# Patient Record
Sex: MALE | Race: WHITE | NOT HISPANIC OR LATINO
[De-identification: names, ages, dates, MRNs, and addresses within clinical notes are randomized per-mention and may not be internally consistent; named-entity substitution may affect disease eponyms.]

---

## 2018-11-06 PROBLEM — Z00.00 ENCOUNTER FOR PREVENTIVE HEALTH EXAMINATION: Status: ACTIVE | Noted: 2018-11-06

## 2018-12-06 ENCOUNTER — APPOINTMENT (OUTPATIENT)
Dept: ENDOCRINOLOGY | Facility: CLINIC | Age: 73
End: 2018-12-06
Payer: MEDICARE

## 2018-12-06 VITALS
OXYGEN SATURATION: 99 % | HEIGHT: 71 IN | WEIGHT: 223 LBS | BODY MASS INDEX: 31.22 KG/M2 | DIASTOLIC BLOOD PRESSURE: 80 MMHG | SYSTOLIC BLOOD PRESSURE: 143 MMHG | HEART RATE: 72 BPM

## 2018-12-06 PROCEDURE — 99204 OFFICE O/P NEW MOD 45 MIN: CPT

## 2019-07-03 ENCOUNTER — RX RENEWAL (OUTPATIENT)
Age: 74
End: 2019-07-03

## 2019-10-16 ENCOUNTER — OTHER (OUTPATIENT)
Age: 74
End: 2019-10-16

## 2019-10-16 ENCOUNTER — APPOINTMENT (OUTPATIENT)
Dept: ENDOCRINOLOGY | Facility: CLINIC | Age: 74
End: 2019-10-16
Payer: MEDICARE

## 2019-10-16 VITALS
DIASTOLIC BLOOD PRESSURE: 70 MMHG | WEIGHT: 222 LBS | SYSTOLIC BLOOD PRESSURE: 122 MMHG | HEART RATE: 68 BPM | HEIGHT: 71 IN | BODY MASS INDEX: 31.08 KG/M2

## 2019-10-16 PROCEDURE — 99214 OFFICE O/P EST MOD 30 MIN: CPT

## 2019-10-16 NOTE — PHYSICAL EXAM
[No Neck Mass] : no neck mass was observed [Clear to Auscultation] : lungs were clear to auscultation bilaterally [Regular Rhythm] : with a regular rhythm

## 2019-10-16 NOTE — ASSESSMENT
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 Repeat calcium level ordered.

## 2019-10-16 NOTE — HISTORY OF PRESENT ILLNESS
[FreeTextEntry1] : h/o post-ablative hypothyroidism; likely treated by me more than 20 years ago. On varying T4 doses, most recently .175; last dose change 6 months ago. Pt. wanted endocrine evaluation so returned here.\par Some fatigue, otherwise without complaints.\par \par concern raised by recent labs revealing a calcium of 10.8 with a slight elevation of PTH; however a simultaneous calcium level was 9.9. No prior levels for comparison available to me.\par \par PMH:\par atrial fib\par HTN\par lipids\par polycystic kidney disease

## 2019-10-17 ENCOUNTER — OTHER (OUTPATIENT)
Age: 74
End: 2019-10-17

## 2019-10-17 LAB
ALBUMIN SERPL ELPH-MCNC: 4.6 G/DL
ALP BLD-CCNC: 53 U/L
ALT SERPL-CCNC: 12 U/L
ANION GAP SERPL CALC-SCNC: 14 MMOL/L
AST SERPL-CCNC: 16 U/L
BILIRUB SERPL-MCNC: 0.7 MG/DL
BUN SERPL-MCNC: 32 MG/DL
CALCIUM SERPL-MCNC: 11.1 MG/DL
CHLORIDE SERPL-SCNC: 108 MMOL/L
CO2 SERPL-SCNC: 21 MMOL/L
CREAT SERPL-MCNC: 2.38 MG/DL
GLUCOSE SERPL-MCNC: 85 MG/DL
PHOSPHATE SERPL-MCNC: 2.6 MG/DL
POTASSIUM SERPL-SCNC: 5.2 MMOL/L
PROT SERPL-MCNC: 7.6 G/DL
SODIUM SERPL-SCNC: 143 MMOL/L

## 2019-12-23 ENCOUNTER — MOBILE ON CALL (OUTPATIENT)
Age: 74
End: 2019-12-23

## 2019-12-23 ENCOUNTER — RX RENEWAL (OUTPATIENT)
Age: 74
End: 2019-12-23

## 2020-03-06 ENCOUNTER — APPOINTMENT (OUTPATIENT)
Dept: ENDOCRINOLOGY | Facility: CLINIC | Age: 75
End: 2020-03-06
Payer: MEDICARE

## 2020-03-06 VITALS
SYSTOLIC BLOOD PRESSURE: 128 MMHG | HEIGHT: 71 IN | HEART RATE: 70 BPM | BODY MASS INDEX: 31.08 KG/M2 | DIASTOLIC BLOOD PRESSURE: 80 MMHG | WEIGHT: 222 LBS

## 2020-03-06 PROCEDURE — 99214 OFFICE O/P EST MOD 30 MIN: CPT

## 2020-03-06 RX ORDER — METOPROLOL SUCCINATE 25 MG/1
25 TABLET, EXTENDED RELEASE ORAL
Refills: 0 | Status: ACTIVE | COMMUNITY

## 2020-03-06 NOTE — ASSESSMENT
[FreeTextEntry1] : primary hyperparathyroidism, mild and asymptomatic. Underlying renal dysfunction due to polycystic kidney disease; doubt risk of further decline due to hyperparathyroidism, but will seek input from pt's nephrologist. 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

## 2020-03-06 NOTE — HISTORY OF PRESENT ILLNESS
[FreeTextEntry1] : h/o post-ablative hypothyroidism; likely treated by me more than 20 years ago. On varying T4 doses, most recently .175; last dose change 6 months ago. Pt. wanted endocrine evaluation so returned here.\par Some fatigue, otherwise without complaints.\par \par concern raised by recent labs revealing a calcium of 10.8 with a slight elevation of PTH\par Current calcium level 10.5 with an elevated ionized calcium and a PTH of 79.\par \par PMH:\par atrial fib\par HTN\par lipids\par polycystic kidney disease

## 2020-03-06 NOTE — REVIEW OF SYSTEMS
[Shortness Of Breath] : shortness of breath [Chest Pain] : no chest pain [FreeTextEntry6] : chronic with exertion

## 2020-06-18 ENCOUNTER — RX RENEWAL (OUTPATIENT)
Age: 75
End: 2020-06-18

## 2020-09-18 ENCOUNTER — APPOINTMENT (OUTPATIENT)
Dept: ENDOCRINOLOGY | Facility: CLINIC | Age: 75
End: 2020-09-18
Payer: MEDICARE

## 2020-09-18 VITALS
HEIGHT: 71 IN | HEART RATE: 72 BPM | WEIGHT: 218 LBS | BODY MASS INDEX: 30.52 KG/M2 | DIASTOLIC BLOOD PRESSURE: 66 MMHG | SYSTOLIC BLOOD PRESSURE: 122 MMHG | OXYGEN SATURATION: 96 %

## 2020-09-18 PROCEDURE — 99214 OFFICE O/P EST MOD 30 MIN: CPT

## 2020-09-18 NOTE — PHYSICAL EXAM
[Thyroid Not Enlarged] : the thyroid was not enlarged [Clear to Auscultation] : lungs were clear to auscultation bilaterally [Normal Rate] : heart rate was normal

## 2020-09-18 NOTE — HISTORY OF PRESENT ILLNESS
[FreeTextEntry1] : h/o post-ablative hypothyroidism;  treated by me in 1994. On varying T4 doses, most recently .175; last dose change 6 months ago. Pt. wanted endocrine evaluation so returned here.\par Some fatigue, otherwise without complaints.\par \par concern raised by recent labs revealing a calcium of 10.8 with a slight elevation of PTH\par Repeat calcium level 10.5 with an elevated ionized calcium and a PTH of 79.\par \par PMH:\par atrial fib\par HTN\par lipids\par polycystic kidney disease

## 2020-09-18 NOTE — ASSESSMENT
[FreeTextEntry1] : primary hyperparathyroidism, mild and asymptomatic. Underlying renal dysfunction due to polycystic kidney disease; doubt risk of further decline due to hyperparathyroidism, but will seek input from pt's nephrologist. My inclination is to observe without resorting to surgery.\par post-ablative hypothyroidism, over-replaced; decrease T4 to .15\par

## 2021-03-25 ENCOUNTER — APPOINTMENT (OUTPATIENT)
Dept: ENDOCRINOLOGY | Facility: CLINIC | Age: 76
End: 2021-03-25
Payer: MEDICARE

## 2021-03-25 VITALS
SYSTOLIC BLOOD PRESSURE: 125 MMHG | DIASTOLIC BLOOD PRESSURE: 80 MMHG | HEIGHT: 71 IN | HEART RATE: 70 BPM | BODY MASS INDEX: 26.88 KG/M2 | OXYGEN SATURATION: 99 % | WEIGHT: 192 LBS

## 2021-03-25 PROCEDURE — 99214 OFFICE O/P EST MOD 30 MIN: CPT

## 2021-03-25 NOTE — ASSESSMENT
[FreeTextEntry1] : primary hyperparathyroidism, mild and asymptomatic. Underlying renal dysfunction due to polycystic kidney disease; doubt risk of further decline due to hyperparathyroidism, but will seek input from pt's nephrologist. My inclination is to observe without resorting to surgery.\par post-ablative hypothyroidism, well replaced on T4 .15\par

## 2021-03-25 NOTE — HISTORY OF PRESENT ILLNESS
[FreeTextEntry1] : h/o post-ablative hypothyroidism;  treated by me in 1994. On varying T4 doses, most recently .15; Pt. wanted endocrine evaluation so returned here.\par Some fatigue, otherwise without complaints.\par \par concern raised by recent labs revealing a calcium of 10.8 with a slight elevation of PTH\par Repeat calcium level 10.5 with an elevated ionized calcium and a PTH of 79.\par \par PMH:\par atrial fib\par HTN\par lipids\par polycystic kidney disease

## 2021-08-28 ENCOUNTER — RX RENEWAL (OUTPATIENT)
Age: 76
End: 2021-08-28

## 2021-09-14 ENCOUNTER — APPOINTMENT (OUTPATIENT)
Dept: ENDOCRINOLOGY | Facility: CLINIC | Age: 76
End: 2021-09-14
Payer: MEDICARE

## 2021-09-14 VITALS
OXYGEN SATURATION: 99 % | DIASTOLIC BLOOD PRESSURE: 64 MMHG | HEIGHT: 70 IN | SYSTOLIC BLOOD PRESSURE: 122 MMHG | BODY MASS INDEX: 26.63 KG/M2 | WEIGHT: 186 LBS | HEART RATE: 52 BPM

## 2021-09-14 PROCEDURE — 99214 OFFICE O/P EST MOD 30 MIN: CPT

## 2021-09-14 NOTE — ASSESSMENT
[FreeTextEntry1] : primary hyperparathyroidism, mild and asymptomatic. Underlying renal dysfunction due to polycystic kidney disease; doubt risk of further decline due to hyperparathyroidism. My inclination is to observe without resorting to surgery.\par post-ablative hypothyroidism, well replaced on T4 .15\par

## 2022-03-15 ENCOUNTER — APPOINTMENT (OUTPATIENT)
Dept: ENDOCRINOLOGY | Facility: CLINIC | Age: 77
End: 2022-03-15
Payer: MEDICARE

## 2022-03-15 VITALS
WEIGHT: 189 LBS | DIASTOLIC BLOOD PRESSURE: 66 MMHG | HEIGHT: 70 IN | SYSTOLIC BLOOD PRESSURE: 118 MMHG | BODY MASS INDEX: 27.06 KG/M2

## 2022-03-15 PROCEDURE — 99214 OFFICE O/P EST MOD 30 MIN: CPT

## 2022-03-15 RX ORDER — ICOSAPENT ETHYL 1000 MG/1
1 CAPSULE ORAL
Refills: 0 | Status: ACTIVE | COMMUNITY

## 2022-03-15 RX ORDER — WARFARIN 5 MG/1
5 TABLET ORAL
Qty: 144 | Refills: 1 | Status: DISCONTINUED | COMMUNITY
End: 2022-03-15

## 2022-03-15 NOTE — ASSESSMENT
[FreeTextEntry1] : primary hyperparathyroidism, worsening, but not symptomatic. Will start sensipar, as pt. is not an ideal candidate for surgery.\par post-ablative hypothyroidism, well replaced on T4 .15\par

## 2022-07-15 ENCOUNTER — APPOINTMENT (OUTPATIENT)
Dept: ENDOCRINOLOGY | Facility: CLINIC | Age: 77
End: 2022-07-15

## 2022-07-15 VITALS
SYSTOLIC BLOOD PRESSURE: 124 MMHG | HEIGHT: 70 IN | OXYGEN SATURATION: 98 % | HEART RATE: 82 BPM | BODY MASS INDEX: 26.34 KG/M2 | DIASTOLIC BLOOD PRESSURE: 70 MMHG | WEIGHT: 184 LBS

## 2022-07-15 PROCEDURE — 99214 OFFICE O/P EST MOD 30 MIN: CPT

## 2022-07-15 NOTE — HISTORY OF PRESENT ILLNESS
[FreeTextEntry1] : h/o post-ablative hypothyroidism;  treated by me in 1994. On varying T4 doses, most recently .15; Pt. wanted endocrine evaluation so returned here.\par Some fatigue, otherwise without complaints.\par \par concern raised by recent labs revealing a calcium of 10.8 with a slight elevation of PTH\par Repeat calcium level 10.5 with an elevated ionized calcium and a PTH of 79. Started on sensipar with improvement\par \par PMH:\par atrial fib\par HTN\par lipids\par polycystic kidney disease

## 2022-07-15 NOTE — ASSESSMENT
[FreeTextEntry1] : primary hyperparathyroidism, controlled on sensipar\par post-ablative hypothyroidism, well replaced on T4 .15\par

## 2022-10-31 ENCOUNTER — RX RENEWAL (OUTPATIENT)
Age: 77
End: 2022-10-31

## 2022-12-13 ENCOUNTER — RX RENEWAL (OUTPATIENT)
Age: 77
End: 2022-12-13

## 2023-01-09 LAB — TSH SERPL-ACNC: 9.27

## 2023-01-10 ENCOUNTER — APPOINTMENT (OUTPATIENT)
Dept: ENDOCRINOLOGY | Facility: CLINIC | Age: 78
End: 2023-01-10
Payer: MEDICARE

## 2023-01-10 VITALS — OXYGEN SATURATION: 98 % | DIASTOLIC BLOOD PRESSURE: 88 MMHG | SYSTOLIC BLOOD PRESSURE: 134 MMHG | HEART RATE: 49 BPM

## 2023-01-10 VITALS — BODY MASS INDEX: 29.06 KG/M2 | HEIGHT: 70 IN | WEIGHT: 203 LBS

## 2023-01-10 PROCEDURE — 99214 OFFICE O/P EST MOD 30 MIN: CPT

## 2023-01-10 NOTE — HISTORY OF PRESENT ILLNESS
[FreeTextEntry1] : h/o post-ablative hypothyroidism;  treated by me in 1994. On varying T4 doses, most recently .175; Pt. wanted endocrine evaluation so returned here.\par Some fatigue, otherwise without complaints.\par \par concern raised by recent labs revealing a calcium of 10.8 with a slight elevation of PTH\par Repeat calcium level 10.5 with an elevated ionized calcium and a PTH of 79. Started on sensipar with improvement\par \par PMH:\par atrial fib\par HTN\par lipids\par polycystic kidney disease

## 2023-01-10 NOTE — ASSESSMENT
[FreeTextEntry1] : primary hyperparathyroidism, controlled on sensipar\par post-ablative hypothyroidism, with increasing need for T4. Advised to take synthroid separately and repeat labs in one month; if no improvement will increase to .2 mg daily\par

## 2023-04-28 ENCOUNTER — RX RENEWAL (OUTPATIENT)
Age: 78
End: 2023-04-28

## 2023-06-20 ENCOUNTER — APPOINTMENT (OUTPATIENT)
Dept: ENDOCRINOLOGY | Facility: CLINIC | Age: 78
End: 2023-06-20

## 2023-08-03 LAB — TSH SERPL-ACNC: 1.08

## 2023-08-04 ENCOUNTER — APPOINTMENT (OUTPATIENT)
Dept: ENDOCRINOLOGY | Facility: CLINIC | Age: 78
End: 2023-08-04
Payer: MEDICARE

## 2023-08-04 VITALS
SYSTOLIC BLOOD PRESSURE: 118 MMHG | BODY MASS INDEX: 28.63 KG/M2 | WEIGHT: 200 LBS | HEIGHT: 70 IN | DIASTOLIC BLOOD PRESSURE: 78 MMHG | HEART RATE: 85 BPM | OXYGEN SATURATION: 99 %

## 2023-08-04 PROCEDURE — 99214 OFFICE O/P EST MOD 30 MIN: CPT

## 2023-08-04 NOTE — HISTORY OF PRESENT ILLNESS
[FreeTextEntry1] : h/o post-ablative hypothyroidism;  treated by me in 1994. On varying T4 doses, most recently .175; Pt. wanted endocrine evaluation so returned here. Some fatigue, otherwise without complaints.  concern raised by recent labs revealing a calcium of 10.8 with a slight elevation of PTH Repeat calcium level 10.5 with an elevated ionized calcium and a PTH of 79. Started on sensipar with improvement  PMH: atrial fib HTN lipids polycystic kidney disease

## 2023-08-04 NOTE — ASSESSMENT
[FreeTextEntry1] : primary hyperparathyroidism, apparently controlled on sensipar post-ablative hypothyroidism, with increasing need for T4. Appears euthyroid.  Update labs

## 2024-01-01 ENCOUNTER — RX RENEWAL (OUTPATIENT)
Age: 79
End: 2024-01-01

## 2024-01-01 RX ORDER — CINACALCET 60 MG/1
60 TABLET ORAL
Qty: 90 | Refills: 3 | Status: ACTIVE | COMMUNITY
Start: 2022-03-15 | End: 1900-01-01

## 2024-02-13 ENCOUNTER — APPOINTMENT (OUTPATIENT)
Dept: ENDOCRINOLOGY | Facility: CLINIC | Age: 79
End: 2024-02-13

## 2024-03-03 ENCOUNTER — RX RENEWAL (OUTPATIENT)
Age: 79
End: 2024-03-03

## 2024-04-16 ENCOUNTER — APPOINTMENT (OUTPATIENT)
Dept: ENDOCRINOLOGY | Facility: CLINIC | Age: 79
End: 2024-04-16

## 2024-05-13 ENCOUNTER — APPOINTMENT (OUTPATIENT)
Dept: ENDOCRINOLOGY | Facility: CLINIC | Age: 79
End: 2024-05-13
Payer: MEDICARE

## 2024-05-13 ENCOUNTER — TRANSCRIPTION ENCOUNTER (OUTPATIENT)
Age: 79
End: 2024-05-13

## 2024-05-13 VITALS
DIASTOLIC BLOOD PRESSURE: 80 MMHG | HEART RATE: 82 BPM | OXYGEN SATURATION: 99 % | HEIGHT: 70 IN | BODY MASS INDEX: 29.92 KG/M2 | WEIGHT: 209 LBS | SYSTOLIC BLOOD PRESSURE: 126 MMHG

## 2024-05-13 DIAGNOSIS — R79.89 OTHER SPECIFIED ABNORMAL FINDINGS OF BLOOD CHEMISTRY: ICD-10-CM

## 2024-05-13 DIAGNOSIS — E89.0 POSTPROCEDURAL HYPOTHYROIDISM: ICD-10-CM

## 2024-05-13 DIAGNOSIS — E21.0 PRIMARY HYPERPARATHYROIDISM: ICD-10-CM

## 2024-05-13 PROCEDURE — 36415 COLL VENOUS BLD VENIPUNCTURE: CPT

## 2024-05-13 PROCEDURE — 99214 OFFICE O/P EST MOD 30 MIN: CPT

## 2024-05-13 PROCEDURE — G2211 COMPLEX E/M VISIT ADD ON: CPT

## 2024-05-13 RX ORDER — LOSARTAN POTASSIUM 100 MG/1
100 TABLET, FILM COATED ORAL
Refills: 0 | Status: ACTIVE | COMMUNITY

## 2024-05-13 RX ORDER — PRAVASTATIN SODIUM 40 MG/1
40 TABLET ORAL
Qty: 90 | Refills: 1 | Status: ACTIVE | COMMUNITY

## 2024-05-13 RX ORDER — APIXABAN 5 MG/1
5 TABLET, FILM COATED ORAL DAILY
Qty: 2 | Refills: 0 | Status: ACTIVE | COMMUNITY

## 2024-05-13 NOTE — ASSESSMENT
[FreeTextEntry1] : primary hyperparathyroidism, apparently controlled on sensipar, repeat CMP today  post-ablative hypothyroidism, with increasing need for T4. Need updated TFTs- done today in office- pt needs to take LT4 properly, alone on empty stomach  Plan to repeat TFTs in 4 weeks regardless of todays results with proper admin  RTO 3- 4 months MD

## 2024-05-13 NOTE — HISTORY OF PRESENT ILLNESS
[FreeTextEntry1] : Pt wife very upset that no one called pt to address Feb labs  Hypothyroidism s/p ablation in 2994  C/O extreme fatigue Now on diuretic from nephrologist   Current regimen LT4 150 mcg daily but taking with all other meds  Last TFTs 2/2024- TSH 10.45, Free T4 1.22   concern raised by recent labs revealing a calcium of 10.8 with a slight elevation of PTH Repeat calcium level 10.5 with an elevated ionized calcium and a PTH of 79. Started on sensipar with improvement  PMH: atrial fib HTN lipids polycystic kidney disease

## 2024-05-13 NOTE — REVIEW OF SYSTEMS
[Fatigue] : fatigue [Recent Weight Loss (___ Lbs)] : recent weight loss: [unfilled] lbs [Visual Field Defect] : no visual field defect [Blurred Vision] : no blurred vision [Dysphagia] : no dysphagia [Neck Pain] : no neck pain [Dysphonia] : no dysphonia [Chest Pain] : no chest pain [Shortness Of Breath] : no shortness of breath [Constipation] : no constipation [Diarrhea] : no diarrhea [Polyuria] : no polyuria [Polydipsia] : no polydipsia [FreeTextEntry2] : lost 11 lbs since being on diuretic (verbal information, not based off our scale)

## 2024-05-13 NOTE — PHYSICAL EXAM
[Alert] : alert [Well Nourished] : well nourished [No Acute Distress] : no acute distress [Normal Sclera/Conjunctiva] : normal sclera/conjunctiva [Normal Hearing] : hearing was normal [Thyroid Not Enlarged] : the thyroid was not enlarged [No Accessory Muscle Use] : no accessory muscle use [Clear to Auscultation] : lungs were clear to auscultation bilaterally [Normal S1, S2] : normal S1 and S2 [Normal Rate] : heart rate was normal [Normal Gait] : normal gait [No Rash] : no rash [No Tremors] : no tremors [Oriented x3] : oriented to person, place, and time [de-identified] : +2 LE edema

## 2024-05-14 LAB
ALBUMIN SERPL ELPH-MCNC: 4.8 G/DL
ALP BLD-CCNC: 58 U/L
ALT SERPL-CCNC: 16 U/L
ANION GAP SERPL CALC-SCNC: 13 MMOL/L
AST SERPL-CCNC: 20 U/L
BILIRUB SERPL-MCNC: 1.1 MG/DL
BUN SERPL-MCNC: 48 MG/DL
CALCIUM SERPL-MCNC: 8.7 MG/DL
CHLORIDE SERPL-SCNC: 101 MMOL/L
CO2 SERPL-SCNC: 28 MMOL/L
CREAT SERPL-MCNC: 3.73 MG/DL
EGFR: 16 ML/MIN/1.73M2
GLUCOSE SERPL-MCNC: 82 MG/DL
POTASSIUM SERPL-SCNC: 5 MMOL/L
PROT SERPL-MCNC: 8 G/DL
SODIUM SERPL-SCNC: 142 MMOL/L
T3FREE SERPL-MCNC: 2.47 PG/ML
T4 FREE SERPL-MCNC: 1.4 NG/DL
TSH SERPL-ACNC: 8.45 UIU/ML

## 2024-06-27 ENCOUNTER — RX RENEWAL (OUTPATIENT)
Age: 79
End: 2024-06-27

## 2024-08-06 ENCOUNTER — APPOINTMENT (OUTPATIENT)
Dept: ENDOCRINOLOGY | Facility: CLINIC | Age: 79
End: 2024-08-06

## 2024-08-06 PROCEDURE — G2211 COMPLEX E/M VISIT ADD ON: CPT

## 2024-08-06 PROCEDURE — 99214 OFFICE O/P EST MOD 30 MIN: CPT

## 2024-08-06 NOTE — HISTORY OF PRESENT ILLNESS
[FreeTextEntry1] : h/o post-ablative hypothyroidism;  treated by me in 1994. On varying T4 doses, most recently .150  concern raised by recent labs revealing a calcium of 10.8 with a slight elevation of PTH Repeat calcium level 10.5 with an elevated ionized calcium and a PTH of 79. Started on sensipar with improvement  PMH: atrial fib HTN lipids polycystic kidney disease, with renal insufficiency

## 2025-03-24 ENCOUNTER — RX RENEWAL (OUTPATIENT)
Age: 80
End: 2025-03-24

## 2025-06-30 ENCOUNTER — RX RENEWAL (OUTPATIENT)
Age: 80
End: 2025-06-30

## 2025-07-09 ENCOUNTER — TRANSCRIPTION ENCOUNTER (OUTPATIENT)
Age: 80
End: 2025-07-09

## 2025-07-17 LAB — TSH SERPL-ACNC: 1.57

## 2025-07-18 ENCOUNTER — APPOINTMENT (OUTPATIENT)
Dept: ENDOCRINOLOGY | Facility: CLINIC | Age: 80
End: 2025-07-18
Payer: MEDICARE

## 2025-07-18 VITALS
SYSTOLIC BLOOD PRESSURE: 120 MMHG | HEIGHT: 70 IN | BODY MASS INDEX: 28.63 KG/M2 | HEART RATE: 87 BPM | WEIGHT: 200 LBS | DIASTOLIC BLOOD PRESSURE: 70 MMHG | OXYGEN SATURATION: 97 %

## 2025-07-18 PROCEDURE — 99214 OFFICE O/P EST MOD 30 MIN: CPT

## 2025-07-18 PROCEDURE — G2211 COMPLEX E/M VISIT ADD ON: CPT

## 2025-08-18 ENCOUNTER — APPOINTMENT (OUTPATIENT)
Dept: ENDOCRINOLOGY | Facility: CLINIC | Age: 80
End: 2025-08-18